# Patient Record
Sex: FEMALE | Employment: FULL TIME | URBAN - METROPOLITAN AREA
[De-identification: names, ages, dates, MRNs, and addresses within clinical notes are randomized per-mention and may not be internally consistent; named-entity substitution may affect disease eponyms.]

---

## 2023-08-09 ENCOUNTER — INITIAL PRENATAL (OUTPATIENT)
Age: 38
End: 2023-08-09

## 2023-08-09 VITALS
WEIGHT: 142 LBS | SYSTOLIC BLOOD PRESSURE: 112 MMHG | BODY MASS INDEX: 21.03 KG/M2 | HEIGHT: 69 IN | DIASTOLIC BLOOD PRESSURE: 66 MMHG

## 2023-08-09 DIAGNOSIS — O09.519 ADVANCED MATERNAL AGE, PRIMIGRAVIDA, ANTEPARTUM: ICD-10-CM

## 2023-08-09 DIAGNOSIS — O09.819 PREGNANCY RESULTING FROM IN VITRO FERTILIZATION, ANTEPARTUM: ICD-10-CM

## 2023-08-09 DIAGNOSIS — Z34.00 SUPERVISION OF NORMAL FIRST PREGNANCY, ANTEPARTUM: Primary | ICD-10-CM

## 2023-08-09 PROBLEM — Z34.90 PREGNANCY: Status: ACTIVE | Noted: 2023-08-09

## 2023-08-09 PROCEDURE — 0502F SUBSEQUENT PRENATAL CARE: CPT | Performed by: OBSTETRICS & GYNECOLOGY

## 2023-08-09 NOTE — PROGRESS NOTES
Current pregnancy history:    Stephen Bowman is a  41 y.o. female Unavailable No LMP recorded. Patient is pregnant. .  She presents for the evaluation of amenorrhea and a positive pregnancy test.    LMP history: This is in an IVF pregnancy, unexplained infertility. Dr. Kelley Simmons. Transfer done on 6/5/23. Ultrasound data today:  TA ULTRASOUND PERFORMED--- IVF  A SINGLE VIABLE 11W5D WITH HUMA OF 02/23/2024 IUP IS SEEN WITH NORMAL CARDIAC RHYTHM. GESTATIONAL AGE BASED ON TODAYS ULTRASOUND. A NORMAL YOLK SAC IS SEEN. RIGHT OVARY APPEARS WITHIN NORMAL LIMITS. LEFT OVARY APPEARS WITHIN NORMAL LIMITS. NO FREE FLUID IS SEEN IN THE CDS. Pregnancy symptoms:    Since her LMP she has experienced  urinary frequency, breast tenderness, and nausea. She has not been vomiting over the last few weeks. Associated signs and symptoms which she denies: dysuria, discharge, vaginal bleeding. Relevant past pregnancy history:   She has the following pregnancy history: G1    Relevant past medical history:(relevant to this pregnancy): noncontributory. Pap/Occupational history:  Last pap smear: last year Results: Normal            Substance history: negative for alcohol, tobacco and street drugs. Positive for nothing. Exposure history: There is/are no indoor cat/s in the home. The patient was instructed to not change the cat litter. She admits close contact with children on a regular basis. She has had chicken pox or the vaccine in the past.   Patient denies issues with domestic violence. Genetic Screening/Teratology Counseling: (Includes patient, baby's father, or anyone in either family with:)  3.  Patient's age >/= 28 at [facility]?--no  2.   Thalassemia ([place], [place], [facility], or  background): MCV<80?--no.     3.  Neural tube defect (meningomyelocele, spina bifida, anencephaly)?--no.   4.  Congenital heart defect?--no.  5.  Down syndrome?--no.   6.  Anthony-Sachs ([place],

## 2023-08-10 LAB
BACTERIA SPEC CULT: NORMAL
SERVICE CMNT-IMP: NORMAL

## 2023-08-13 LAB — SPECIMEN STATUS REPORT: NORMAL

## 2023-08-14 LAB
C TRACH RRNA SPEC QL NAA+PROBE: NEGATIVE
N GONORRHOEA RRNA SPEC QL NAA+PROBE: NEGATIVE
T VAGINALIS RRNA SPEC QL NAA+PROBE: NEGATIVE

## 2023-09-04 SDOH — ECONOMIC STABILITY: INCOME INSECURITY: HOW HARD IS IT FOR YOU TO PAY FOR THE VERY BASICS LIKE FOOD, HOUSING, MEDICAL CARE, AND HEATING?: PATIENT DECLINED

## 2023-09-04 SDOH — ECONOMIC STABILITY: FOOD INSECURITY: WITHIN THE PAST 12 MONTHS, THE FOOD YOU BOUGHT JUST DIDN'T LAST AND YOU DIDN'T HAVE MONEY TO GET MORE.: PATIENT DECLINED

## 2023-09-04 SDOH — ECONOMIC STABILITY: TRANSPORTATION INSECURITY
IN THE PAST 12 MONTHS, HAS LACK OF TRANSPORTATION KEPT YOU FROM MEETINGS, WORK, OR FROM GETTING THINGS NEEDED FOR DAILY LIVING?: PATIENT DECLINED

## 2023-09-04 SDOH — ECONOMIC STABILITY: FOOD INSECURITY: WITHIN THE PAST 12 MONTHS, YOU WORRIED THAT YOUR FOOD WOULD RUN OUT BEFORE YOU GOT MONEY TO BUY MORE.: PATIENT DECLINED

## 2023-09-04 SDOH — ECONOMIC STABILITY: HOUSING INSECURITY
IN THE LAST 12 MONTHS, WAS THERE A TIME WHEN YOU DID NOT HAVE A STEADY PLACE TO SLEEP OR SLEPT IN A SHELTER (INCLUDING NOW)?: PATIENT REFUSED

## 2023-09-07 ENCOUNTER — ROUTINE PRENATAL (OUTPATIENT)
Age: 38
End: 2023-09-07

## 2023-09-07 VITALS — BODY MASS INDEX: 21.56 KG/M2 | SYSTOLIC BLOOD PRESSURE: 128 MMHG | DIASTOLIC BLOOD PRESSURE: 62 MMHG | WEIGHT: 146 LBS

## 2023-09-07 DIAGNOSIS — Z34.90 PREGNANCY, UNSPECIFIED GESTATIONAL AGE: ICD-10-CM

## 2023-09-07 DIAGNOSIS — Z34.90 ENCOUNTER FOR SUPERVISION OF NORMAL PREGNANCY, ANTEPARTUM, UNSPECIFIED GRAVIDITY: Primary | ICD-10-CM

## 2023-09-07 PROCEDURE — 0502F SUBSEQUENT PRENATAL CARE: CPT | Performed by: OBSTETRICS & GYNECOLOGY

## 2023-09-07 NOTE — PROGRESS NOTES
Patient arrived to appointment with spouse. No concerns or complaints at this time.   Denies N/V    IOB labs to be drawn today  Declines panorama; AFP today

## 2023-09-08 LAB
ABO + RH BLD: NORMAL
BLOOD BANK CMNT PATIENT-IMP: NORMAL
BLOOD GROUP ANTIBODIES SERPL: NORMAL
ERYTHROCYTE [DISTWIDTH] IN BLOOD BY AUTOMATED COUNT: 12.9 % (ref 11.5–14.5)
HBV SURFACE AG SER QL: <0.1 INDEX
HBV SURFACE AG SER QL: NEGATIVE
HCT VFR BLD AUTO: 35.3 % (ref 35–47)
HCV AB SER IA-ACNC: 0.07 INDEX
HCV AB SERPL QL IA: NONREACTIVE
HGB BLD-MCNC: 11.5 G/DL (ref 11.5–16)
HIV 1+2 AB+HIV1 P24 AG SERPL QL IA: NONREACTIVE
HIV 1/2 RESULT COMMENT: NORMAL
MCH RBC QN AUTO: 29.6 PG (ref 26–34)
MCHC RBC AUTO-ENTMCNC: 32.6 G/DL (ref 30–36.5)
MCV RBC AUTO: 90.7 FL (ref 80–99)
NRBC # BLD: 0 K/UL (ref 0–0.01)
NRBC BLD-RTO: 0 PER 100 WBC
PLATELET # BLD AUTO: 251 K/UL (ref 150–400)
PMV BLD AUTO: 11.1 FL (ref 8.9–12.9)
RBC # BLD AUTO: 3.89 M/UL (ref 3.8–5.2)
RUBV IGG SERPL IA-ACNC: NORMAL IU/ML
SPECIMEN EXP DATE BLD: NORMAL
WBC # BLD AUTO: 10 K/UL (ref 3.6–11)

## 2023-09-10 LAB
AFP INTERP SERPL-IMP: NORMAL
AFP MOM SERPL: 0.68
AFP SERPL-MCNC: 22.2 NG/ML
AGE AT DELIVERY: 36.9 YR
COMMENT: NORMAL
GA METHOD: NORMAL
GA: 15.4 WEEKS
IDDM PATIENT QL: NO
Lab: NORMAL
MAT SCN FOR FETAL ABNORMALITIES SERPL: NORMAL
MULTIPLE PREGNANCY: NO
NEURAL TUBE DEFECT RISK FETUS: NORMAL
VZV IGG SER IA-ACNC: <135 INDEX

## 2023-09-11 LAB — T PALLIDUM AB SER QL IA: NON REACTIVE

## 2023-09-12 LAB
HGB A MFR BLD: 97.2 % (ref 96.4–98.8)
HGB A2 MFR BLD COLUMN CHROM: 2.8 % (ref 1.8–3.2)
HGB F MFR BLD: 0 % (ref 0–2)
HGB FRACT BLD-IMP: NORMAL
HGB S MFR BLD: 0 %

## 2023-10-10 ENCOUNTER — TELEPHONE (OUTPATIENT)
Age: 38
End: 2023-10-10

## 2023-10-10 NOTE — TELEPHONE ENCOUNTER
You 3:20 just called and said she would not be able to make her appt today. Had a scan done @ [facility] earlier today that we should be able to review. She will call back to get back on the schedule when she has a moment. she had an emergency come up.

## 2023-10-11 ENCOUNTER — TELEPHONE (OUTPATIENT)
Age: 38
End: 2023-10-11

## 2023-10-11 NOTE — TELEPHONE ENCOUNTER
Called and left a message for the patient. She had to cancel her last visit and we just wanted to try and get her back on the schedule. If the patient calls back, please reschedule her OB visit.  ( Not on Tuesday 10/17)

## 2023-10-24 ENCOUNTER — ROUTINE PRENATAL (OUTPATIENT)
Age: 38
End: 2023-10-24

## 2023-10-24 VITALS — WEIGHT: 152 LBS | SYSTOLIC BLOOD PRESSURE: 104 MMHG | BODY MASS INDEX: 22.45 KG/M2 | DIASTOLIC BLOOD PRESSURE: 54 MMHG

## 2023-10-24 DIAGNOSIS — Z34.90 ENCOUNTER FOR SUPERVISION OF NORMAL PREGNANCY, ANTEPARTUM, UNSPECIFIED GRAVIDITY: Primary | ICD-10-CM

## 2023-10-24 DIAGNOSIS — Z34.90 PREGNANCY, UNSPECIFIED GESTATIONAL AGE: ICD-10-CM

## 2023-10-24 PROCEDURE — 0502F SUBSEQUENT PRENATAL CARE: CPT | Performed by: OBSTETRICS & GYNECOLOGY

## 2023-10-24 RX ORDER — ASPIRIN 81 MG/1
81 TABLET, CHEWABLE ORAL DAILY
COMMUNITY

## 2023-11-20 ENCOUNTER — ROUTINE PRENATAL (OUTPATIENT)
Age: 38
End: 2023-11-20

## 2023-11-20 VITALS — SYSTOLIC BLOOD PRESSURE: 100 MMHG | DIASTOLIC BLOOD PRESSURE: 68 MMHG | WEIGHT: 158 LBS | BODY MASS INDEX: 23.33 KG/M2

## 2023-11-20 DIAGNOSIS — Z34.90 PREGNANCY, UNSPECIFIED GESTATIONAL AGE: Primary | ICD-10-CM

## 2023-11-20 PROCEDURE — 0502F SUBSEQUENT PRENATAL CARE: CPT | Performed by: OBSTETRICS & GYNECOLOGY

## 2023-11-30 RX ORDER — OSELTAMIVIR PHOSPHATE 75 MG/1
75 CAPSULE ORAL DAILY
Qty: 7 CAPSULE | Refills: 0 | Status: SHIPPED | OUTPATIENT
Start: 2023-11-30 | End: 2023-12-07

## 2024-01-05 ENCOUNTER — ROUTINE PRENATAL (OUTPATIENT)
Age: 39
End: 2024-01-05

## 2024-01-05 VITALS — BODY MASS INDEX: 24.66 KG/M2 | SYSTOLIC BLOOD PRESSURE: 116 MMHG | DIASTOLIC BLOOD PRESSURE: 74 MMHG | WEIGHT: 167 LBS

## 2024-01-05 DIAGNOSIS — Z34.03 ENCOUNTER FOR SUPERVISION OF NORMAL FIRST PREGNANCY IN THIRD TRIMESTER: Primary | ICD-10-CM

## 2024-01-18 ENCOUNTER — ROUTINE PRENATAL (OUTPATIENT)
Age: 39
End: 2024-01-18

## 2024-01-18 VITALS — SYSTOLIC BLOOD PRESSURE: 116 MMHG | WEIGHT: 173 LBS | BODY MASS INDEX: 25.55 KG/M2 | DIASTOLIC BLOOD PRESSURE: 72 MMHG

## 2024-01-18 DIAGNOSIS — Z34.90 ENCOUNTER FOR SUPERVISION OF NORMAL PREGNANCY, ANTEPARTUM, UNSPECIFIED GRAVIDITY: Primary | ICD-10-CM

## 2024-01-18 PROCEDURE — 0502F SUBSEQUENT PRENATAL CARE: CPT | Performed by: OBSTETRICS & GYNECOLOGY

## 2024-01-18 NOTE — PROGRESS NOTES
PT c/o restless legs nightly for the last 2 weeks. Conservative management fully reviewed and all questions answered. Otherwise well, good fm.

## 2024-01-31 ENCOUNTER — ROUTINE PRENATAL (OUTPATIENT)
Age: 39
End: 2024-01-31

## 2024-01-31 VITALS — BODY MASS INDEX: 25.25 KG/M2 | WEIGHT: 171 LBS | SYSTOLIC BLOOD PRESSURE: 134 MMHG | DIASTOLIC BLOOD PRESSURE: 76 MMHG

## 2024-01-31 DIAGNOSIS — Z34.90 PREGNANCY, UNSPECIFIED GESTATIONAL AGE: ICD-10-CM

## 2024-01-31 DIAGNOSIS — Z34.03 ENCOUNTER FOR SUPERVISION OF NORMAL FIRST PREGNANCY IN THIRD TRIMESTER: Primary | ICD-10-CM

## 2024-01-31 PROCEDURE — 0502F SUBSEQUENT PRENATAL CARE: CPT | Performed by: OBSTETRICS & GYNECOLOGY

## 2024-01-31 RX ORDER — MAGNESIUM 30 MG
30 TABLET ORAL 2 TIMES DAILY
COMMUNITY

## 2024-01-31 NOTE — PROGRESS NOTES
Reiewed precautions regarding BP; will monitor at home  Ultrasound today. Doing well. GBS today.  LIMITED OB SCAN A SINGLE VERTEX 36W2D IUP IS SEEN. FETAL CARDIAC MOTION OBSERVED. LIMITED ANATOMY WAS VISUALIZED AND APPEARS WNL. EFW= 7 LB 0 OZ ( 69.1 %) LB= 12.78 CM PLACENTA APPEARS WITHIN NORMAL LIMITS.

## 2024-02-01 LAB
CREAT UR-MCNC: 54.7 MG/DL
PROT UR-MCNC: 16 MG/DL (ref 0–11.9)
PROT/CREAT UR-RTO: 0.3

## 2024-02-02 LAB — GP B STREP DNA SPEC QL NAA+PROBE: NEGATIVE

## 2024-02-07 ENCOUNTER — ROUTINE PRENATAL (OUTPATIENT)
Age: 39
End: 2024-02-07

## 2024-02-07 VITALS — DIASTOLIC BLOOD PRESSURE: 84 MMHG | SYSTOLIC BLOOD PRESSURE: 134 MMHG | BODY MASS INDEX: 25.55 KG/M2 | WEIGHT: 173 LBS

## 2024-02-07 DIAGNOSIS — Z34.03 ENCOUNTER FOR SUPERVISION OF NORMAL FIRST PREGNANCY IN THIRD TRIMESTER: Primary | ICD-10-CM

## 2024-02-07 PROCEDURE — 0502F SUBSEQUENT PRENATAL CARE: CPT | Performed by: OBSTETRICS & GYNECOLOGY

## 2024-02-07 NOTE — PROGRESS NOTES
C/o restless legs and headache for past 2 hrs; hasn't taken any tylenol  Bps at home 101-135/60-80  Lengthy discussion regarding precauitons and low threshold to go to L&D for evaluation

## 2024-02-14 ENCOUNTER — ROUTINE PRENATAL (OUTPATIENT)
Age: 39
End: 2024-02-14

## 2024-02-14 VITALS — SYSTOLIC BLOOD PRESSURE: 130 MMHG | BODY MASS INDEX: 25.4 KG/M2 | WEIGHT: 172 LBS | DIASTOLIC BLOOD PRESSURE: 82 MMHG

## 2024-02-14 DIAGNOSIS — Z34.90 PREGNANCY, UNSPECIFIED GESTATIONAL AGE: Primary | ICD-10-CM

## 2024-02-14 PROCEDURE — 0502F SUBSEQUENT PRENATAL CARE: CPT | Performed by: OBSTETRICS & GYNECOLOGY

## 2024-02-14 NOTE — PROGRESS NOTES
Doing well, ultrasound today.    LIMITED OB SCAN A SINGLE VERTEX 38W2D IUP IS SEEN. FETAL CARDIAC MOTION OBSERVED. LIMITED ANATOMY WAS VISUALIZED AND APPEARS WNL. BPP= 8/8 LB= 20.76 CM PLACENTA APPEARS WITHIN NORMAL LIMITS.

## 2024-02-20 LAB
C TRACH RRNA SPEC QL NAA+PROBE: NEGATIVE
N GONORRHOEA RRNA SPEC QL NAA+PROBE: NEGATIVE
SPECIMEN SOURCE: NORMAL
T VAGINALIS RRNA SPEC QL NAA+PROBE: NEGATIVE